# Patient Record
Sex: MALE | Race: WHITE | Employment: OTHER | URBAN - METROPOLITAN AREA
[De-identification: names, ages, dates, MRNs, and addresses within clinical notes are randomized per-mention and may not be internally consistent; named-entity substitution may affect disease eponyms.]

---

## 2017-01-04 ENCOUNTER — PRE VISIT (OUTPATIENT)
Dept: NEUROLOGY | Facility: CLINIC | Age: 32
End: 2017-01-04

## 2017-01-04 NOTE — TELEPHONE ENCOUNTER
1.  Date/reason for appt:  1/18/17   Right Hand Motor Difficulty    2.  Referring provider:  Self    3.  Call to patient (Yes / No - short description): Yes, spoke to mother.  Most recently being seen [facility].  Emailed LAURA's to [email or web address] and [email or web address]

## 2017-01-09 NOTE — TELEPHONE ENCOUNTER
Received imaging disc in the mail - just says MRI 2006. Unsure what images and from which clinic/hospital. - sent to film room.     Received neuropsychological evaluation from [facility], [facility] - forwarded to clinic.

## 2017-01-10 NOTE — TELEPHONE ENCOUNTER
Received signed LAURA's and faxed with cover sheets to [facility] and [facility].

## 2017-01-11 NOTE — TELEPHONE ENCOUNTER
Radiology reports received from [facility], will forward to clinic. Notified the film room to pull images.  CT head on 9/17/16, 6/13/12  Right hand on 6/13/12  CT Cspine on 6/13/12

## 2017-01-17 ASSESSMENT — ENCOUNTER SYMPTOMS
DIZZINESS: 1
NUMBNESS: 0
SPEECH CHANGE: 0
HEADACHES: 0
WEAKNESS: 0
SEIZURES: 0
LOSS OF CONSCIOUSNESS: 0
PARALYSIS: 0
TREMORS: 1
TINGLING: 0
DISTURBANCES IN COORDINATION: 1
MEMORY LOSS: 1

## 2017-01-18 ENCOUNTER — TELEPHONE (OUTPATIENT)
Dept: NEUROLOGY | Facility: CLINIC | Age: 32
End: 2017-01-18

## 2017-01-18 ENCOUNTER — OFFICE VISIT (OUTPATIENT)
Dept: NEUROLOGY | Facility: CLINIC | Age: 32
End: 2017-01-18

## 2017-01-18 VITALS
HEART RATE: 77 BPM | WEIGHT: 167.6 LBS | OXYGEN SATURATION: 96 % | HEIGHT: 68 IN | DIASTOLIC BLOOD PRESSURE: 78 MMHG | RESPIRATION RATE: 20 BRPM | SYSTOLIC BLOOD PRESSURE: 132 MMHG | BODY MASS INDEX: 25.4 KG/M2

## 2017-01-18 DIAGNOSIS — R25.1 TREMOR: Primary | ICD-10-CM

## 2017-01-18 RX ORDER — PRIMIDONE 50 MG/1
TABLET ORAL
Qty: 60 TABLET | Refills: 3 | Status: SHIPPED | OUTPATIENT
Start: 2017-01-18

## 2017-01-18 ASSESSMENT — PAIN SCALES - GENERAL: PAINLEVEL: NO PAIN (0)

## 2017-01-18 NOTE — TELEPHONE ENCOUNTER
Talked with provider regarding how he wanted to titrate the patients Primidone specifically. Provider said that he wanted to increase patients primidone per instructions: Please take 12.5 mg at night for one week, then 25 mg at night for one week,  Then 12.5 mg in the morning and  25 mg at night for one week , then 25 mg twice a day for one week, then 25mg in the morning and  37.5 mg at night for one week, then 37.5 mg twice a day for one week, then 37.5 mg in the morning and 50 mg at night for one week, then 50 mg twice a day.    Called into pharmacy and gave VO for remaining directions to pharmacist Papi.
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
## 2017-01-18 NOTE — PATIENT INSTRUCTIONS
Please take 12.5 mg at night for one week, then 25 mg at night for one week,  Then 12.5 mg in the morning and  25 mg at night for one week , then 25 mg twice a day for one week, then 25mg in the morning and  37.5 mg at night for one week, then 37.5 mg twice a day for one week, then 37.5 mg in the morning and 50 mg at night for one week, then 50 mg twice a day

## 2017-01-18 NOTE — TELEPHONE ENCOUNTER
----- Message from Eva Bolanos RN sent at 2017  2:48 PM CST -----  Regarding: Dr Hilton pt    Contact: [phone]  Please call [facility] to clarify the following ordfer:        Disp Refills Start End SUDHIR    primidone (MYSOLINE) 50 MG tablet 60 tablet 3 2017  --    Si.5 mg at qhs for 1 week, then 25 mg qhs for 1 week,  Then 12.5 mg QAM and  25 mg QPM for 1 week , then 25 mgBID for1 week    Class: E-Prescribe    Notes to Pharmacy: 12.5 mg at qhs for 1 week, then 25 mg qhs for 1 week,  Then 12.5 mg QAM and  25 mg QPM for 1 week , then 25 mgBID for1 week,25mg QAM and 37.5 mg QPM for one week, then 37.5 mg BID for 1 week,    Order: [id number]    E-Prescribing Status: Receipt confirmed by pharmacy (2017  1:22 PM CST)     Medication Administration Instructions     12.5 mg at qhs for 1 week, then 25 mg qhs for 1 week,  Then 12.5 mg QAM and  25 mg QPM for 1 week , then 25 mgBID for1 week

## 2017-01-18 NOTE — Clinical Note
Date:January 20, 2017      Patient was self referred, no letter generated. Do not send.        [facility] Health Information

## 2017-01-18 NOTE — MR AVS SNAPSHOT
After Visit Summary   1/18/2017    Henri Burgos    MRN: [id number]           Patient Information     Date Of Birth          1985        Visit Information        Provider Department      1/18/2017 12:30 PM Maxim Hilton MD [facility] Neurology        Today's Diagnoses     Tremor    -  1       Care Instructions    Please take 12.5 mg at night for one week, then 25 mg at night for one week,  Then 12.5 mg in the morning and  25 mg at night for one week , then 25 mg twice a day for one week, then 25mg in the morning and  37.5 mg at night for one week, then 37.5 mg twice a day for one week, then 37.5 mg in the morning and 50 mg at night for one week, then 50 mg twice a day        Follow-ups after your visit        Follow-up notes from your care team     Return in about 3 months (around 4/18/2017).      Your next 10 appointments already scheduled     Apr 26, 2017 12:30 PM   (Arrive by 12:15 PM)   Return Movement Disorder with Maxim Hilton MD   [facility] Neurology ([facility] [place])    [address] [place], [place] [id number] [phone]              Who to contact     Please call your clinic at [phone] to:    Ask questions about your health    Make or cancel appointments    Discuss your medicines    Learn about your test results    Speak to your doctor   If you have compliments or concerns about an experience at your clinic, or if you wish to file a complaint, please contact [facility] Patient Relations at [phone] or email us at [email or web address]         Additional Information About Your Visit        MyChart Information     [employer] gives you secure access to your electronic health record. If you see a primary care provider, you can also send messages to your care team and make appointments. If you have questions, please call your primary care clinic.  If you do not have a primary care  "provider, please call [phone] and they will assist you.      [employer] is an electronic gateway that provides easy, online access to your medical records. With [employer], you can request a clinic appointment, read your test results, renew a prescription or communicate with your care team.     To access your existing account, please contact your [facility] or call [phone] for assistance.        Care EveryWhere ID     This is your Care EveryWhere ID. This could be used by other organizations to access your [place] medical records  [id number]        Your Vitals Were     Pulse Respirations Height BMI (Body Mass Index) Pulse Oximetry       77 20 1.727 m (5' 8\") 25.49 kg/m2 96%        Blood Pressure from Last 3 Encounters:   01/18/17 132/78    Weight from Last 3 Encounters:   01/18/17 76.023 kg (167 lb 9.6 oz)              Today, you had the following     No orders found for display         Today's Medication Changes          These changes are accurate as of: 1/18/17  1:31 PM.  If you have any questions, ask your nurse or doctor.               Start taking these medicines.        Dose/Directions    primidone 50 MG tablet   Commonly known as:  MYSOLINE   Used for:  Tremor   Started by:  Maxim Hilton MD        12.5 mg at qhs for 1 week, then 25 mg qhs for 1 week,  Then 12.5 mg QAM and  25 mg QPM for 1 week , then 25 mgBID for1 week   Quantity:  60 tablet   Refills:  3            Where to get your medicines      These medications were sent to [id number] - [place], [place] - [address] AT [facility] OF [address]  [address], [facility] [address]     Phone:  [phone]    - primidone 50 MG tablet             Primary Care Provider Office Phone # Fax #    Gerald Villeda [phone]       [facility] [address]  [place] [address]        Thank you!     Thank you for choosing [facility] NEUROLOGY  for your care. " Our goal is always to provide you with excellent care. Hearing back from our patients is one way we can continue to improve our services. Please take a few minutes to complete the written survey that you may receive in the mail after your visit with us. Thank you!             Your Updated Medication List - Protect others around you: Learn how to safely use, store and throw away your medicines at www.disposemymeds.org.          This list is accurate as of: 1/18/17  1:31 PM.  Always use your most recent med list.                   Brand Name Dispense Instructions for use    primidone 50 MG tablet    MYSOLINE    60 tablet    12.5 mg at qhs for 1 week, then 25 mg qhs for 1 week,  Then 12.5 mg QAM and  25 mg QPM for 1 week , then 25 mgBID for1 week
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
## 2019-11-05 ENCOUNTER — HEALTH MAINTENANCE LETTER (OUTPATIENT)
Age: 34
End: 2019-11-05

## 2020-02-16 ENCOUNTER — HEALTH MAINTENANCE LETTER (OUTPATIENT)
Age: 35
End: 2020-02-16

## 2020-11-22 ENCOUNTER — HEALTH MAINTENANCE LETTER (OUTPATIENT)
Age: 35
End: 2020-11-22

## 2021-04-04 ENCOUNTER — HEALTH MAINTENANCE LETTER (OUTPATIENT)
Age: 36
End: 2021-04-04

## 2021-08-02 ENCOUNTER — HOSPITAL ENCOUNTER (EMERGENCY)
Facility: OTHER | Age: 36
Discharge: LEFT BEFORE DISCHARGE | End: 2021-08-02
Payer: MEDICARE

## 2021-08-02 VITALS
SYSTOLIC BLOOD PRESSURE: 133 MMHG | RESPIRATION RATE: 20 BRPM | DIASTOLIC BLOOD PRESSURE: 102 MMHG | TEMPERATURE: 96.8 F | WEIGHT: 177 LBS | BODY MASS INDEX: 26.91 KG/M2 | OXYGEN SATURATION: 95 % | HEART RATE: 71 BPM

## 2021-08-03 NOTE — ED TRIAGE NOTES
ED Nursing Triage Note (General)   ________________________________    Henri Burgos is a 35 year old Male that presents to triage private car. Patient comes to ER with c/o lac to back of 5th left toe. Brother of patient stated he stubbed it on a stair. Minimal bleeding. Gauze placed over toe.   BP (!) 133/102   Pulse 71   Temp 96.8  F (36  C) (Tympanic)   Resp 20   Wt 80.3 kg (177 lb)   SpO2 95%   BMI 26.91 kg/m  t  Patient appears alert , in no acute distress., and cooperative and calm behavior.    GCS Total = 15  Airway: intact  Breathing noted as Normal.  Circulation Normal  Skin normal  Action taken:  Triage to critical care immediately      PRE HOSPITAL PRIOR LIVING SITUATION Alone

## 2021-09-19 ENCOUNTER — HEALTH MAINTENANCE LETTER (OUTPATIENT)
Age: 36
End: 2021-09-19

## 2022-05-01 ENCOUNTER — HEALTH MAINTENANCE LETTER (OUTPATIENT)
Age: 37
End: 2022-05-01

## 2022-11-20 ENCOUNTER — HEALTH MAINTENANCE LETTER (OUTPATIENT)
Age: 37
End: 2022-11-20

## 2023-06-02 ENCOUNTER — HEALTH MAINTENANCE LETTER (OUTPATIENT)
Age: 38
End: 2023-06-02